# Patient Record
Sex: FEMALE | Race: WHITE | Employment: OTHER | URBAN - NONMETROPOLITAN AREA
[De-identification: names, ages, dates, MRNs, and addresses within clinical notes are randomized per-mention and may not be internally consistent; named-entity substitution may affect disease eponyms.]

---
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
## 2024-01-06 ENCOUNTER — OFFICE VISIT (OUTPATIENT)
Age: 24
End: 2024-01-06

## 2024-01-06 VITALS
TEMPERATURE: 98.2 F | HEART RATE: 93 BPM | SYSTOLIC BLOOD PRESSURE: 118 MMHG | WEIGHT: 234 LBS | DIASTOLIC BLOOD PRESSURE: 79 MMHG | OXYGEN SATURATION: 97 %

## 2024-01-06 DIAGNOSIS — R30.0 DYSURIA: Primary | ICD-10-CM

## 2024-01-06 LAB
BILIRUBIN, POC: NEGATIVE
BLOOD URINE, POC: NEGATIVE
CLARITY, POC: CLEAR
COLOR, POC: YELLOW
GLUCOSE URINE, POC: NEGATIVE
KETONES, POC: NEGATIVE
LEUKOCYTE EST, POC: NEGATIVE
NITRITE, POC: NEGATIVE
PH, POC: 7.5
PROTEIN, POC: NEGATIVE
SPECIFIC GRAVITY, POC: 1.01
SPECIMEN TYPE: NORMAL
TRICHOMONAS VAGINALIS SCREEN: NEGATIVE
UROBILINOGEN, POC: 0.2

## 2024-01-06 RX ORDER — ZINC SULFATE 50(220)MG
1 CAPSULE ORAL DAILY
COMMUNITY
Start: 2023-12-17

## 2024-01-06 RX ORDER — LORATADINE 10 MG/1
TABLET ORAL
COMMUNITY
Start: 2023-12-17

## 2024-01-06 RX ORDER — CYANOCOBALAMIN (VITAMIN B-12) 1000 MCG
TABLET ORAL
COMMUNITY
Start: 2023-12-17

## 2024-01-06 RX ORDER — OMEPRAZOLE 20 MG/1
CAPSULE, DELAYED RELEASE ORAL
COMMUNITY
Start: 2023-12-17

## 2024-01-06 RX ORDER — TRAZODONE HYDROCHLORIDE 50 MG/1
TABLET ORAL
COMMUNITY
Start: 2023-12-17

## 2024-01-06 RX ORDER — TOPIRAMATE 100 MG/1
TABLET, FILM COATED ORAL
COMMUNITY
Start: 2023-12-17

## 2024-01-06 RX ORDER — ZIPRASIDONE HYDROCHLORIDE 60 MG/1
60 CAPSULE ORAL
COMMUNITY
Start: 2019-08-31

## 2024-01-06 ASSESSMENT — ENCOUNTER SYMPTOMS
VOMITING: 0
ABDOMINAL PAIN: 0
DIARRHEA: 0
SORE THROAT: 0
SINUS PRESSURE: 0
BACK PAIN: 1

## 2024-01-06 NOTE — PROGRESS NOTES
Danielle Newsome (:  2000) is a 23 y.o. female,New patient, here for evaluation of the following chief complaint(s):  Urinary Tract Infection (Vaginal itching, irritation, burning after urination, low back pain. Symptoms x 5 days)      ASSESSMENT/PLAN:  1. Dysuria    - POCT Urinalysis no Micro  - C.trachomatis N.gonorrhoeae DNA, Thin Prep; Future  - Trichomonas by EIA; Future   -awaiting test result,advised pt to increase fluid intake,avoid intercourse.to the ER if worsening symptoms    No follow-ups on file.    SUBJECTIVE/OBJECTIVE:  Pt is sexually active with a new boyfriend.      History provided by:  Patient  Urinary Tract Infection  The current episode started in the past 7 days. The problem has been gradually worsening since onset. Pertinent negatives include no hematuria or pain.       Vitals:    24 1252   BP: 118/79   Site: Right Upper Arm   Position: Sitting   Cuff Size: Medium Adult   Pulse: 93   Temp: 98.2 °F (36.8 °C)   TempSrc: Oral   SpO2: 97%   Weight: 106.1 kg (234 lb)       Review of Systems   Constitutional:  Negative for activity change, appetite change and chills.   HENT:  Negative for congestion, sinus pressure and sore throat.    Gastrointestinal:  Negative for abdominal pain, diarrhea and vomiting.   Genitourinary:  Positive for dysuria and frequency. Negative for hematuria, urgency, vaginal bleeding, vaginal discharge and vaginal pain.   Musculoskeletal:  Positive for back pain.   Skin:  Negative for rash.       Physical Exam  Constitutional:       General: She is not in acute distress.  HENT:      Nose: No congestion.      Mouth/Throat:      Mouth: Mucous membranes are moist.      Pharynx: No oropharyngeal exudate or posterior oropharyngeal erythema.   Eyes:      Conjunctiva/sclera: Conjunctivae normal.      Pupils: Pupils are equal, round, and reactive to light.   Cardiovascular:      Rate and Rhythm: Normal rate and regular rhythm.   Pulmonary:      Effort: Pulmonary effort is

## 2024-01-09 LAB
C TRACH DNA UR QL NAA+PROBE: NEGATIVE
N GONORRHOEA DNA UR QL NAA+PROBE: NEGATIVE

## 2024-01-15 ENCOUNTER — TELEPHONE (OUTPATIENT)
Age: 24
End: 2024-01-15